# Patient Record
Sex: FEMALE | Race: WHITE | NOT HISPANIC OR LATINO
[De-identification: names, ages, dates, MRNs, and addresses within clinical notes are randomized per-mention and may not be internally consistent; named-entity substitution may affect disease eponyms.]

---

## 2017-01-06 ENCOUNTER — APPOINTMENT (OUTPATIENT)
Dept: PSYCHIATRY | Facility: CLINIC | Age: 22
End: 2017-01-06

## 2017-01-27 ENCOUNTER — APPOINTMENT (OUTPATIENT)
Dept: PSYCHIATRY | Facility: CLINIC | Age: 22
End: 2017-01-27
Payer: COMMERCIAL

## 2017-01-27 PROCEDURE — 99214 OFFICE O/P EST MOD 30 MIN: CPT

## 2017-03-03 ENCOUNTER — APPOINTMENT (OUTPATIENT)
Dept: PSYCHIATRY | Facility: CLINIC | Age: 22
End: 2017-03-03

## 2017-04-07 ENCOUNTER — RX RENEWAL (OUTPATIENT)
Age: 22
End: 2017-04-07

## 2017-04-25 ENCOUNTER — APPOINTMENT (OUTPATIENT)
Dept: PSYCHIATRY | Facility: CLINIC | Age: 22
End: 2017-04-25

## 2017-04-25 RX ORDER — AZITHROMYCIN 250 MG/1
250 TABLET, FILM COATED ORAL
Qty: 6 | Refills: 0 | Status: DISCONTINUED | COMMUNITY
Start: 2016-10-26

## 2017-04-25 RX ORDER — ALBUTEROL SULFATE 90 UG/1
108 (90 BASE) AEROSOL, METERED RESPIRATORY (INHALATION)
Qty: 8 | Refills: 0 | Status: DISCONTINUED | COMMUNITY
Start: 2017-01-15

## 2017-04-25 RX ORDER — AMOXICILLIN AND CLAVULANATE POTASSIUM 875; 125 MG/1; MG/1
875-125 TABLET, COATED ORAL
Qty: 20 | Refills: 0 | Status: DISCONTINUED | COMMUNITY
Start: 2016-10-29

## 2017-04-25 RX ORDER — ZOLPIDEM TARTRATE 5 MG/1
5 TABLET ORAL
Qty: 30 | Refills: 0 | Status: DISCONTINUED | COMMUNITY
Start: 2016-10-29

## 2017-04-25 RX ORDER — CIPROFLOXACIN 3 MG/ML
0.3 SOLUTION OPHTHALMIC
Qty: 5 | Refills: 0 | Status: DISCONTINUED | COMMUNITY
Start: 2017-03-19

## 2017-04-25 RX ORDER — FLUTICASONE PROPIONATE 50 UG/1
50 SPRAY, METERED NASAL
Qty: 16 | Refills: 0 | Status: DISCONTINUED | COMMUNITY
Start: 2016-10-26

## 2017-05-30 ENCOUNTER — APPOINTMENT (OUTPATIENT)
Dept: PSYCHIATRY | Facility: CLINIC | Age: 22
End: 2017-05-30

## 2017-06-14 ENCOUNTER — RESULT REVIEW (OUTPATIENT)
Age: 22
End: 2017-06-14

## 2017-07-11 ENCOUNTER — APPOINTMENT (OUTPATIENT)
Dept: PSYCHIATRY | Facility: CLINIC | Age: 22
End: 2017-07-11

## 2017-07-11 RX ORDER — GABAPENTIN 600 MG/1
600 TABLET, COATED ORAL
Qty: 30 | Refills: 2 | Status: DISCONTINUED | COMMUNITY
Start: 2017-01-06 | End: 2017-07-11

## 2017-08-22 ENCOUNTER — APPOINTMENT (OUTPATIENT)
Dept: PSYCHIATRY | Facility: CLINIC | Age: 22
End: 2017-08-22
Payer: COMMERCIAL

## 2017-08-22 PROCEDURE — 96127 BRIEF EMOTIONAL/BEHAV ASSMT: CPT

## 2017-08-22 PROCEDURE — 99214 OFFICE O/P EST MOD 30 MIN: CPT

## 2017-08-22 RX ORDER — OMEPRAZOLE 40 MG/1
40 CAPSULE, DELAYED RELEASE ORAL
Qty: 30 | Refills: 0 | Status: ACTIVE | COMMUNITY
Start: 2017-05-23

## 2017-08-22 RX ORDER — LIDOCAINE HYDROCHLORIDE 20 MG/ML
2 JELLY TOPICAL
Qty: 30 | Refills: 0 | Status: DISCONTINUED | COMMUNITY
Start: 2017-07-14

## 2017-08-22 RX ORDER — NORETHINDRONE ACETATE AND ETHINYL ESTRADIOL AND FERROUS FUMARATE 1MG-20(21)
1-20 KIT ORAL
Qty: 84 | Refills: 0 | Status: ACTIVE | COMMUNITY
Start: 2017-07-30

## 2017-08-22 RX ORDER — PHENAZOPYRIDINE HYDROCHLORIDE 200 MG/1
200 TABLET ORAL
Qty: 15 | Refills: 0 | Status: DISCONTINUED | COMMUNITY
Start: 2017-06-18

## 2017-08-22 RX ORDER — NITROFURANTOIN (MONOHYDRATE/MACROCRYSTALS) 25; 75 MG/1; MG/1
100 CAPSULE ORAL
Qty: 14 | Refills: 0 | Status: DISCONTINUED | COMMUNITY
Start: 2017-06-18

## 2017-08-22 RX ORDER — PANTOPRAZOLE 40 MG/1
40 TABLET, DELAYED RELEASE ORAL
Qty: 30 | Refills: 0 | Status: DISCONTINUED | COMMUNITY
Start: 2017-05-01

## 2017-08-22 RX ORDER — FLUCONAZOLE 150 MG/1
150 TABLET ORAL
Qty: 1 | Refills: 0 | Status: DISCONTINUED | COMMUNITY
Start: 2017-06-20

## 2017-08-22 RX ORDER — VALACYCLOVIR 1 G/1
1 TABLET, FILM COATED ORAL
Qty: 14 | Refills: 0 | Status: DISCONTINUED | COMMUNITY
Start: 2017-06-29

## 2017-09-12 ENCOUNTER — APPOINTMENT (OUTPATIENT)
Dept: PSYCHIATRY | Facility: CLINIC | Age: 22
End: 2017-09-12

## 2017-10-09 ENCOUNTER — APPOINTMENT (OUTPATIENT)
Dept: PSYCHIATRY | Facility: CLINIC | Age: 22
End: 2017-10-09

## 2017-10-12 ENCOUNTER — APPOINTMENT (OUTPATIENT)
Dept: PSYCHIATRY | Facility: CLINIC | Age: 22
End: 2017-10-12
Payer: COMMERCIAL

## 2017-10-12 PROCEDURE — 99214 OFFICE O/P EST MOD 30 MIN: CPT

## 2017-10-12 RX ORDER — GABAPENTIN 800 MG/1
800 TABLET, COATED ORAL
Qty: 60 | Refills: 2 | Status: DISCONTINUED | COMMUNITY
Start: 2017-04-25 | End: 2017-10-12

## 2017-10-26 ENCOUNTER — APPOINTMENT (OUTPATIENT)
Dept: PSYCHIATRY | Facility: CLINIC | Age: 22
End: 2017-10-26
Payer: COMMERCIAL

## 2017-10-26 PROCEDURE — 99214 OFFICE O/P EST MOD 30 MIN: CPT

## 2017-11-14 ENCOUNTER — APPOINTMENT (OUTPATIENT)
Dept: PSYCHIATRY | Facility: CLINIC | Age: 22
End: 2017-11-14
Payer: COMMERCIAL

## 2017-11-14 DIAGNOSIS — Z91.19 PATIENT'S NONCOMPLIANCE WITH OTHER MEDICAL TREATMENT AND REGIMEN: ICD-10-CM

## 2017-11-14 PROCEDURE — 99214 OFFICE O/P EST MOD 30 MIN: CPT

## 2017-12-12 ENCOUNTER — APPOINTMENT (OUTPATIENT)
Dept: PSYCHIATRY | Facility: CLINIC | Age: 22
End: 2017-12-12
Payer: COMMERCIAL

## 2017-12-12 PROCEDURE — 99214 OFFICE O/P EST MOD 30 MIN: CPT

## 2018-01-09 ENCOUNTER — APPOINTMENT (OUTPATIENT)
Dept: PSYCHIATRY | Facility: CLINIC | Age: 23
End: 2018-01-09
Payer: COMMERCIAL

## 2018-01-09 DIAGNOSIS — F12.90 CANNABIS USE, UNSPECIFIED, UNCOMPLICATED: ICD-10-CM

## 2018-01-09 PROCEDURE — 99214 OFFICE O/P EST MOD 30 MIN: CPT

## 2018-01-15 ENCOUNTER — EMERGENCY (EMERGENCY)
Facility: HOSPITAL | Age: 23
LOS: 1 days | Discharge: HOME | End: 2018-01-15
Admitting: EMERGENCY MEDICINE
Payer: COMMERCIAL

## 2018-01-15 VITALS
HEART RATE: 99 BPM | DIASTOLIC BLOOD PRESSURE: 66 MMHG | SYSTOLIC BLOOD PRESSURE: 140 MMHG | TEMPERATURE: 99 F | OXYGEN SATURATION: 100 % | RESPIRATION RATE: 16 BRPM

## 2018-01-15 DIAGNOSIS — F31.9 BIPOLAR DISORDER, UNSPECIFIED: ICD-10-CM

## 2018-01-15 PROCEDURE — 99284 EMERGENCY DEPT VISIT MOD MDM: CPT

## 2018-01-15 PROCEDURE — 90792 PSYCH DIAG EVAL W/MED SRVCS: CPT | Mod: GC

## 2018-01-15 NOTE — ED PROVIDER NOTE - MEDICAL DECISION MAKING DETAILS
21y/o Female hx of bipolar takes Wellbutrin, Xanax, gabapentin presents to ED for psych eval reporting feeling depressed w/ negative thoughts to harm herself.  Pt is well appearing w/o visible signs of physical injuries on exam, alert and oriented, articulate speech, head NCAT, no C-spine tend, ambulating w/ steady gait, lungs are clear bilat, cor rrr, abd sft, nt, nd, nr, no guarding, ext no edema.  Psych consulted and dispo per psych-

## 2018-01-15 NOTE — ED BEHAVIORAL HEALTH NOTE - BEHAVIORAL HEALTH NOTE
Patient is a 22 year old female who was brought in to the emergency room by her mother for depression.  Writer met with patient who provided her mother's contact information for Maria Eugenia .  Patient states she resides in [place] and has been seeing Dr. Arias at the [facility]. Patient states she has an appointment with Dr. TROTTER tomorrow, but felt her medications needed adjustment so her therapist advised her to go to [facility] because, "they are the best".    Writer called pt's mother who provided the following information.  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  Patient is diagnosed with bipolar disorder was on medication for a long time Lamictal/ Gabapentin/ and Wellbutrin.  Patient reportedly switched Lamictal to something else that the mother doesn't know what it is and has been on a downward spiral.  She states patient's mood was switching rapidly had crying spells last week.  Patient was started on Xanax 1/9/18 and "something for mood".  Patient's mother states patient's mood swings seemed to stop and patient remains depressed.  She asked patient if patient wanted to hurt self, and patient denied it.  She reports patient had made passive statements, "I don't want to be like this".  She states patient did not have a plan to hurt herself when she asked patient directly.  Patient saw her Primary Care doctor today who suggested patient go back on the Lamictal/Gabapentin/ and a low dose of Wellbutrin.  Patient's psychiatrist is on Vacation this week, but patient has an appointment 1/16/18 with another psychiatrist In the office.  Patient's mother thought patient could have a medication change made today before seeing the psychiatrist tomorrow. Patient is a 22 year old female who was brought in to the emergency room by her mother for depression.  Writer met with patient who provided her mother's contact information for Maria Eugenia .  Patient states she resides in [place] and has been seeing Dr. Arias at the [facility]. Patient states she has an appointment with Dr. TROTTER tomorrow, but felt her medications needed adjustment so her therapist advised her to go to [facility] because, "they are the best".    Writer called pt's mother who provided the following information.  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  Patient is diagnosed with bipolar disorder was on medication for a long time Lamictal/ Gabapentin/ and Wellbutrin.  Patient reportedly switched Lamictal to something else that the mother doesn't know what it is and has been on a downward spiral.  She states patient's mood was switching rapidly had crying spells last week.  Patient was started on Xanax 1/9/18 and "something for mood".  Patient's mother states patient's mood swings seemed to stop and patient remains depressed.  She asked patient if patient wanted to hurt self, and patient denied it.  She reports patient had made passive statements, "I don't want to be like this".  She states patient did not have a plan to hurt herself when she asked patient directly.  Patient saw her Primary Care doctor today who suggested patient go back on the Lamictal/Gabapentin/ and a low dose of Wellbutrin.  Patient's psychiatrist is on Vacation this week, but patient has an appointment 1/16/18 with another psychiatrist In the office.  Patient's mother thought patient could have a medication change made today before seeing the psychiatrist tomorrow.  Patient's mother has no safety concerns.  She states for the most part there is someone at home most of the time.

## 2018-01-15 NOTE — ED BEHAVIORAL HEALTH ASSESSMENT NOTE - CASE SUMMARY
22 F, , living with parents, employed as [employer], completed [employer], PPH of bipolar disorder, ADHD, 1 past psych admission ([facility] 2010), no known SA, substance misuse with MJ and nicotine, no relevant PMH, who presents from home at behest of self for passive suicidality in setting of increased emotional lability. Collateral has no acute safety concern and patient may be discharged to follow up with outpatient provider tomorrow.     Patient admits to passive suicidal ideation but denies plan or intent. Denies psychosis. Reports mood lability in the context of stopping lamictal. Has an outpatient appointment with outpatient psychiatrist tomorrow to address this. Patient

## 2018-01-15 NOTE — ED BEHAVIORAL HEALTH ASSESSMENT NOTE - DESCRIPTION
Patient arrived in , calm and cooperative, no 1:1 or PRNs needed. denies 22 F, , living with parents, employed as [employer], completed [employer] school

## 2018-01-15 NOTE — ED ADULT TRIAGE NOTE - CHIEF COMPLAINT QUOTE
Pt states she is depressed and having thoughts of hurting herself no plan denies homicidal ideation no c/o of hearing voices. PMH bipolar disorder.
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
## 2018-01-15 NOTE — ED PROVIDER NOTE - OBJECTIVE STATEMENT
The patient is a 22y Female hx of bipolar takes Wellbutrin, Xanax, gabapentin presents to ED for psych eval reporting feeling depressed w/ negative thoughts to harm herself.  Pt denies all other medical complaints, no self inflicted injuries, no ingestion.  Denies trauma or falls, no headache, back or neck pain, no abd/flank pain, no uti/urinary symptoms, nausea or vomiting, no fever or chills, no cp or sob, no palpitations or diaphoresis.  Denies etoh or illicit drugs, no HI, no AVH. LMP about one month ago.
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
## 2018-01-15 NOTE — ED BEHAVIORAL HEALTH ASSESSMENT NOTE - SUMMARY
22 F, , living with parents, employed as [employer], completed [employer] school, PPH of bipolar disorder, ADHD, 1 past psych admission ([facility] 2010), no known SA, substance misuse with MJ and nicotine, no relevant PMH, who presents from home at behest of self for passive suicidality in setting of increased emotional lability. On interview, patient with bipolar disorder and early recurrence of symptoms in setting of stopping mood stabilizer a few months ago. Given reason for discontinuation of meds (see HPI), psychoeducation provided to patient and parents to encourage future adherence to treatment.

## 2018-01-15 NOTE — ED BEHAVIORAL HEALTH ASSESSMENT NOTE - DESCRIPTION (FIRST USE, LAST USE, QUANTITY, FREQUENCY, DURATION)
1ppd very occasional, denies in last few months as "makes me depressed" occasional MJ a few times per week

## 2018-01-15 NOTE — ED BEHAVIORAL HEALTH ASSESSMENT NOTE - RISK ASSESSMENT
Modifiable risk factors: bipolar disorder. Unmodifiable risk factors: past inpt psych admission, strong FH of bipolar disorder and suicide attempts. Protective factors: strong family support, positive therapeutic relationships, adherent to treatment, appropriate help seeking. Given above, patient is appropriate for outpatient follow-up as documented below. She declined voluntary admission and does not meet criteria for involuntary admission.

## 2018-01-15 NOTE — ED BEHAVIORAL HEALTH ASSESSMENT NOTE - SUICIDE PROTECTIVE FACTORS
Responsibility to family and others/Fear of death or dying due to pain/suffering/Supportive social network or family/Positive therapeutic relationships/Ability to cope with stress/Identifies reasons for living/Future oriented

## 2018-01-16 ENCOUNTER — APPOINTMENT (OUTPATIENT)
Dept: PSYCHIATRY | Facility: CLINIC | Age: 23
End: 2018-01-16
Payer: COMMERCIAL

## 2018-01-16 DIAGNOSIS — F14.10 COCAINE ABUSE, UNCOMPLICATED: ICD-10-CM

## 2018-01-16 DIAGNOSIS — F10.10 ALCOHOL ABUSE, UNCOMPLICATED: ICD-10-CM

## 2018-01-16 PROCEDURE — 99214 OFFICE O/P EST MOD 30 MIN: CPT

## 2018-01-16 RX ORDER — LAMOTRIGINE 25 MG/1
25 TABLET ORAL
Qty: 90 | Refills: 0 | Status: ACTIVE | COMMUNITY
Start: 2018-01-16 | End: 1900-01-01

## 2018-01-16 RX ORDER — CLONAZEPAM 0.5 MG/1
0.5 TABLET ORAL
Qty: 30 | Refills: 0 | Status: DISCONTINUED | COMMUNITY
Start: 2017-10-26 | End: 2018-01-16

## 2018-01-16 RX ORDER — RISPERIDONE 1 MG/1
1 TABLET, FILM COATED ORAL
Qty: 30 | Refills: 2 | Status: DISCONTINUED | COMMUNITY
Start: 2017-10-26 | End: 2018-01-16

## 2018-01-23 ENCOUNTER — APPOINTMENT (OUTPATIENT)
Dept: PSYCHIATRY | Facility: CLINIC | Age: 23
End: 2018-01-23
Payer: COMMERCIAL

## 2018-01-23 DIAGNOSIS — F60.3 BORDERLINE PERSONALITY DISORDER: ICD-10-CM

## 2018-01-23 DIAGNOSIS — F39 UNSPECIFIED MOOD [AFFECTIVE] DISORDER: ICD-10-CM

## 2018-01-23 PROCEDURE — 99214 OFFICE O/P EST MOD 30 MIN: CPT

## 2018-01-23 RX ORDER — GABAPENTIN 600 MG/1
600 TABLET, COATED ORAL 4 TIMES DAILY
Qty: 120 | Refills: 2 | Status: ACTIVE | COMMUNITY
Start: 2018-01-09 | End: 1900-01-01

## 2018-01-23 RX ORDER — ALPRAZOLAM 1 MG/1
1 TABLET ORAL
Qty: 14 | Refills: 0 | Status: ACTIVE | COMMUNITY
Start: 2018-01-09 | End: 1900-01-01

## 2018-01-23 RX ORDER — QUETIAPINE FUMARATE 25 MG/1
25 TABLET ORAL TWICE DAILY
Qty: 60 | Refills: 2 | Status: ACTIVE | COMMUNITY
Start: 2018-01-23 | End: 1900-01-01

## 2018-01-30 ENCOUNTER — APPOINTMENT (OUTPATIENT)
Dept: PSYCHIATRY | Facility: CLINIC | Age: 23
End: 2018-01-30

## 2018-02-06 ENCOUNTER — OUTPATIENT (OUTPATIENT)
Dept: OUTPATIENT SERVICES | Facility: HOSPITAL | Age: 23
LOS: 1 days | Discharge: STILL ADMITTED OR ADMINISTRATIVE | End: 2018-02-06

## 2018-02-07 DIAGNOSIS — F31.9 BIPOLAR DISORDER, UNSPECIFIED: ICD-10-CM

## 2018-02-13 ENCOUNTER — APPOINTMENT (OUTPATIENT)
Dept: PSYCHIATRY | Facility: CLINIC | Age: 23
End: 2018-02-13

## 2023-03-22 ENCOUNTER — EMERGENCY (EMERGENCY)
Facility: HOSPITAL | Age: 28
LOS: 1 days | Discharge: OTHER INSTITUTION | End: 2023-03-22
Attending: EMERGENCY MEDICINE
Payer: COMMERCIAL

## 2023-03-22 VITALS
WEIGHT: 164.91 LBS | SYSTOLIC BLOOD PRESSURE: 134 MMHG | OXYGEN SATURATION: 99 % | TEMPERATURE: 98 F | HEART RATE: 92 BPM | DIASTOLIC BLOOD PRESSURE: 103 MMHG | RESPIRATION RATE: 22 BRPM | HEIGHT: 69 IN

## 2023-03-22 VITALS
HEART RATE: 80 BPM | OXYGEN SATURATION: 97 % | SYSTOLIC BLOOD PRESSURE: 128 MMHG | RESPIRATION RATE: 18 BRPM | DIASTOLIC BLOOD PRESSURE: 70 MMHG | TEMPERATURE: 98 F

## 2023-03-22 PROCEDURE — 99285 EMERGENCY DEPT VISIT HI MDM: CPT

## 2023-03-22 NOTE — ED ADULT NURSE NOTE - CAS ELECT INFOMATION PROVIDED
DC instructions given by MD to parents and verbalized understanding. Pt remains alert and O x4. NAD noted. Resp E/U. Pt denies any pain./DC instructions

## 2023-03-22 NOTE — ED PROVIDER NOTE - PATIENT PORTAL LINK FT
You can access the FollowMyHealth Patient Portal offered by [facility] by registering at the following website: [email or web address]. By joining [facility] FollowMyHealth portal, you will also be able to view your health information using other applications (apps) compatible with our system.

## 2023-03-22 NOTE — ED PROVIDER NOTE - PHYSICAL EXAMINATION
General: well appearing, NAD  Head: NC, AT  EENT: EOMI, no scleral icterus  Cardiac: RRR, no apparent murmurs, no lower extremity edema  Respiratory: CTABL, no respiratory distress   Abdomen: soft, ND, NT, nonperitonitic  MSK/Vascular: full ROM, soft compartments, warm extremities  Neuro: AAOx3, sensation to light touch intact  Psych: calm, cooperative  SKIN: no skin changes or animal bites

## 2023-03-22 NOTE — ED PROVIDER NOTE - OBJECTIVE STATEMENT
27 y f with pmh of anxiety and depression presenting from [facility]. Pt's father called facility with concern of ferret bite causing encephalitis, rabes, lymes. Pt denies bites. Has not seen ferrets in a week. Pt is currently calm, responding to questions reasonably. A&Ox3. Denies confusion, cp, sob, n/v, ab pain, f/c, HA, change in strength or sensation.

## 2023-03-22 NOTE — CHART NOTE - NSCHARTNOTEFT_GEN_A_CORE
Sw Note: Worker alerted by MD that pt has been medically cleared for return to [facility], where she has been receiving psychiatric care. Pt has [facility] aide at bedside. Continuing, MD reports that report has been provided to [facility] NP (chloe) to confirm return. Worker arranged a [place] for transport to [facility] via  EMS ([place]). RN provided safety questions. NEAF created and uploaded to [facility]. Transport letter left with pts transfer packet. No other SW needs.

## 2023-03-22 NOTE — ED ADULT TRIAGE NOTE - CHIEF COMPLAINT QUOTE
pt a+ox3, sent to ED from [facility] for eval. per staff, pt father called today and stated pt was "bit by parrot and unknown when it happened". pt denies being bit by parrot, no breaks or bites in skin. pt states she was sent here for "evaluation of psychosis." denies SI/HI, AH/VH. pt with staff from [facility] and in blue paper scrubs on arrival.

## 2023-03-22 NOTE — ED PROVIDER NOTE - ATTENDING CONTRIBUTION TO CARE
I, Elva Avalos, have personally seen and examined this patient. I have fully participated in the care of this patient. I have reviewed all pertinent clinical information, including history, physical exam, plan and the Resident's note and agree except as noted below.     28yo F with substance abuse admits to last week using mushrooms/kratom/THC when started being paranoid some auditory hallucintions, was at [place] sunday-tuesday and improved and sent to [facility]. today dad called and was concerned about the ferrets she has owned for 4+years, they live inside in cages. she does kiss them and cuddle with them, but no known bites. one was ill a few weeks ago but then recovered and is doing fine. no exposure to outside animals. no Bites. Pt is now stating she is improved. denies any hallucinations. no HA. no neck pain. no rash. no GI sx. CT head negative at [place].   Gen: NAD, AOx3  Head: NCAT  HEENT: EOMI, oral mucosa moist, normal conjunctiva, neck supple, no meningismus   Lung: CTAB, no respiratory distress  CV: rrr, no murmur, Normal perfusion  Abd: soft, NTND  MSK: No edema, no visible deformities  Neuro: No focal neurologic deficits, CN II-XII intact, 5/5 global strength, sensation intact, no dysmetria/ataxia, gait intact   Skin: No rash   Psych: flat affect    Spoke with Keyonna at [facility] NP, explained patient has no sign of animal bite, there is no concern for meningitis/encephelitis with normal mental status, AOX3 and normal neuro exam without fever and no meningismus. rabies- no wounds, no sx consistent with rabies do not give prophylaxis in this setting. no GI sx for salmonella, no tick bites or lyme symptoms. labs and full work up performed at [place]. not indicated to repeat at this time. cleared for back to [facility]

## 2023-03-22 NOTE — ED ADULT NURSE NOTE - OBJECTIVE STATEMENT
Received AAOx4 from [facility], pt was sent for evaluation to ED because her dad thought she was bitten by a parrot. No s/s visible injuries noted. No c/o Cx pain, no labored breathing. Pt placed on yellow gown for safety. [facility] PCA at bedside. Pt was also recently d/c from [facility] about 2 days go. Will cont' to monitor.

## 2023-03-22 NOTE — ED PROVIDER NOTE - CLINICAL SUMMARY MEDICAL DECISION MAKING FREE TEXT BOX
Pt assessed for illnesses listed in HPI. Pt does not have any of the above. A&Ox3, not have any AMS, confusion. The illness of concern do not fluctuate. Will send back to [facility].

## 2023-03-22 NOTE — ED ADULT NURSE NOTE - CHIEF COMPLAINT QUOTE
pt a+ox3, sent to ED from [facility] for eval. per staff, pt father called today and stated pt was "bit by parrot and unknown when it happened". pt denies being bit by parrot, no breaks or bites in skin. pt states she was sent here for "evaluation of psychosis." denies SI/HI, AH/VH. pt with staff from [facility] and in blue paper scrubs on arrival.

## 2023-03-23 PROBLEM — F31.30 BIPOLAR DISORDER, CURRENT EPISODE DEPRESSED, MILD OR MODERATE SEVERITY, UNSPECIFIED: Chronic | Status: ACTIVE | Noted: 2018-01-15
